# Patient Record
Sex: MALE | Race: WHITE
[De-identification: names, ages, dates, MRNs, and addresses within clinical notes are randomized per-mention and may not be internally consistent; named-entity substitution may affect disease eponyms.]

---

## 2020-12-25 ENCOUNTER — HOSPITAL ENCOUNTER (EMERGENCY)
Dept: HOSPITAL 77 - KA.ED | Age: 30
Discharge: HOME | End: 2020-12-25
Payer: COMMERCIAL

## 2020-12-25 DIAGNOSIS — S01.311A: ICD-10-CM

## 2020-12-25 DIAGNOSIS — Z87.891: ICD-10-CM

## 2020-12-25 DIAGNOSIS — S01.01XA: Primary | ICD-10-CM
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
## 2020-12-25 NOTE — EDM.PDOC
ED HPI GENERAL MEDICAL PROBLEM





- General


Chief Complaint: General


Stated Complaint: head laceration


Time Seen by Provider: 12/25/20 19:24


Source of Information: Reports: Patient





- History of Present Illness


INITIAL COMMENTS - FREE TEXT/NARRATIVE: 











[place], 30-year-old male, tonight was smoking a turkey on his porch at his 

residence.  He went outside to check on this and slipped causing him to fall 

after losing his balance.  Unfortunately he has no railing on his porch yielding

him falling onto the frozen ground.  He struck the right side of his head and 

occipital region with no loss of consciousness.


He denies any dizziness, headache, or difficulty in function.





Had significant bleeding from his right ear as well as the scalp which was 

controlled by the time of his arrival to the emergency department.





He believes he is up-to-date on his tetanus being less than 8 years but will 

confirm this with his wife as well as his clinic by Monday.  


Denies any contributing factors.


Onset: Today, Sudden


Location: Reports: Head


Quality: Reports: Ache


Severity: Moderate


Improves with: Reports: None


Worsens with: Reports: None


Context: Reports: Activity





Past Medical History





- Past Health History


Medical/Surgical History: Denies Medical/Surgical History





Social & Family History





- Family History


Family Medical History: No Pertinent Family History





- Tobacco Use


Tobacco Use Status *Q: Former Tobacco User


Tobacco Use Within Last Twelve Months: No


Month/Year Tobacco Last Used: 10?


Smoking Cessation Information Provided To Patient: No





- Caffeine Use


Caffeine Use: Reports: Coffee





- Alcohol Use


Alcohol Use History: Yes





- Recreational Drug Use


Recreational Drug Use: No





- Sexual History


Sexual History: Reports: Single Partner





- Living Situation & Occupation


Living situation: Reports: , with Family





ED ROS GENERAL





- Review of Systems


Review Of Systems: Comprehensive ROS is negative, except as noted in HPI.





ED EXAM, GENERAL





- Physical Exam


Exam: See Below


Free Text/Narrative:: 





Alert, oriented, in no acute distress with no cyanosis nor pallor noted.


HEENT shows a jagged laceration 3 cm to the superior occipital region with no 

bleeding at this time.  There is no contaminant nor debris.


There is a flap 0.5 cm to the superior portion of the helix of the right ear 

with no active bleeding.


PERRLA no icterus no injection.


Neck soft supple no lymphadenopathy.


There is no respiratory distress.


Able to speak in full sentences with no discomfort nor shortness of breath 

exhibited.


Cardiac is regular radial pulse present and correlating.





Focused examination to the scalp shows there is some hair inside the abraised 

tearing type laceration of the scalp.


A nice clean laceration to the superior portion of the auricle is noted.





ED GENERAL MEDICAL PROCEDURES





- Laceration/Wound Repair


  ** Occipital


Lac/wound length in cm: 3


Appearance: Irregular, Clean


Distal NVT: Neuro & Vascular Intact


Anesthetic Type: Other (Let solution)


Local Anesthesia - Lidocaine (Xylocaine): Other (Let solution)


Closed with: Staples


# of Sutures: 6





  ** Right Upper Ear


Lac/wound length in cm: 0.5


Appearance: Clean


Distal NVT: Neuro & Vascular Intact


Anesthetic Type: Local


Local Anesthesia - Lidocaine (Xylocaine): 2% Plain


Local Anesthetic Volume: 1cc


Skin Prep: Chlorhexidine (Hibiciens)


Exploration/Debridement/Repair: No Foreign Material Found


Closed with: Sutures


Suture Size: 6-0


# of Sutures: 4


Suture Type: Nylon





Course





- Orders/Labs/Meds


Meds: 


Medications














Discontinued Medications














Generic Name Dose Route Start Last Admin





  Trade Name Daiana  PRN Reason Stop Dose Admin


 


Lidocaine  Confirm  12/25/20 19:34 





  Xylocaine-Mpf 2%  Administered  12/25/20 19:35 





  Dose  





  5 ml  





  .ROUTE  





  .STK-MED ONE  














- Re-Assessments/Exams


Free Text/Narrative Re-Assessment/Exam: 





12/25/20 20:11


Tolerate staples with topical stating he felt a sting only.


Ear was infiltrated with 2% lidocaine no epinephrine and 4 interrupted sutures 

placed to which he did not notice any placement.





Departure





- Departure


Time of Disposition: 19:55


Disposition: Home, Self-Care 01


Condition: Good


Clinical Impression: 


 Laceration of scalp, Laceration of ear without foreign body








- Discharge Information


*PRESCRIPTION DRUG MONITORING PROGRAM REVIEWED*: Not Applicable


*COPY OF PRESCRIPTION DRUG MONITORING REPORT IN PATIENT GIAN: Not Applicable


Instructions:  Sutures, Staples, or Adhesive Wound Closure, Easy-to-Read, 

Sutured Wound Care, Easy-to-Read


Referrals: 


Tash Humphrey PA-C [Primary Care Provider] - 


Forms:  ED Department Discharge


Additional Instructions: 





You may shower, but avoid soaking.


You may take ibuprofen or Tylenol for pain fever, or swelling.


You need to have stitches removed in 7 days contact the clinic of your choice 

for removal of staples and stitches on 31 December.





Contact your clinic or discussed with your wife, when your last tetanus shot was

as they are good for 10 years.





Follow-up as needed with clinic or return to the emergency department over the 

weekend.





You may apply ice to the area to control swelling.





- Problem List & Annotations


(1) Laceration of scalp


SNOMED Code(s): 321432951


   Code(s): S01.01XA - LACERATION WITHOUT FOREIGN BODY OF SCALP, INITIAL 

ENCOUNTER   Status: Acute   Priority: High   


Qualifiers: 


   Encounter type: initial encounter   Qualified Code(s): S01.01XA - Laceration 

without foreign body of scalp, initial encounter   





(2) Laceration of ear without foreign body


SNOMED Code(s): 851204879


   Code(s): S01.319A - LACERATION WITHOUT FOREIGN BODY OF UNSP EAR, INIT ENCNTR 

 Status: Acute   Priority: High   


Qualifiers: 


   Encounter type: initial encounter   Laterality: right   Qualified Code(s): 

S01.311A - Laceration without foreign body of right ear, initial encounter   





- Assessment/Plan


Plan: 





You may shower, but avoid soaking.


You may take ibuprofen or Tylenol for pain fever, or swelling.


You need to have stitches removed in 7 days contact the clinic of your choice 

for removal of staples and stitches on 31 December.





Contact your clinic or discussed with your wife, when your last tetanus shot was

as they are good for 10 years.





Follow-up as needed with clinic or return to the emergency department over the 

weekend.





You may apply ice to the area to control swelling.